# Patient Record
Sex: MALE | Race: WHITE | NOT HISPANIC OR LATINO | URBAN - METROPOLITAN AREA
[De-identification: names, ages, dates, MRNs, and addresses within clinical notes are randomized per-mention and may not be internally consistent; named-entity substitution may affect disease eponyms.]

---

## 2021-06-30 ENCOUNTER — HOSPITAL ENCOUNTER (OUTPATIENT)
Dept: RADIOLOGY | Facility: HOSPITAL | Age: 26
Discharge: HOME | End: 2021-06-30
Attending: RADIOLOGY

## 2021-06-30 DIAGNOSIS — Z76.89 REFERRAL OF PATIENT WITHOUT EXAMINATION OR TREATMENT: ICD-10-CM

## 2021-07-12 VITALS
DIASTOLIC BLOOD PRESSURE: 80 MMHG | WEIGHT: 315 LBS | HEIGHT: 68 IN | TEMPERATURE: 97.6 F | HEART RATE: 80 BPM | BODY MASS INDEX: 47.74 KG/M2 | SYSTOLIC BLOOD PRESSURE: 130 MMHG

## 2021-07-12 DIAGNOSIS — M76.72 PERONEAL TENDINITIS OF LOWER LEG, LEFT: ICD-10-CM

## 2021-07-12 DIAGNOSIS — S93.402A SPRAIN OF UNSPECIFIED LIGAMENT OF LEFT ANKLE, INITIAL ENCOUNTER: Primary | ICD-10-CM

## 2021-07-12 DIAGNOSIS — M25.572 ACUTE LEFT ANKLE PAIN: ICD-10-CM

## 2021-07-12 PROCEDURE — 99243 OFF/OP CNSLTJ NEW/EST LOW 30: CPT | Performed by: STUDENT IN AN ORGANIZED HEALTH CARE EDUCATION/TRAINING PROGRAM

## 2021-07-12 RX ORDER — LISINOPRIL 30 MG/1
30 TABLET ORAL
COMMUNITY
Start: 2021-03-26

## 2021-07-12 RX ORDER — CARVEDILOL 25 MG/1
25 TABLET ORAL
COMMUNITY
Start: 2021-03-26

## 2021-07-12 RX ORDER — AMLODIPINE BESYLATE 10 MG/1
TABLET ORAL
COMMUNITY
Start: 2021-06-26

## 2021-07-12 RX ORDER — HYDROCHLOROTHIAZIDE 12.5 MG/1
12.5 CAPSULE, GELATIN COATED ORAL
COMMUNITY
Start: 2021-03-26

## 2021-07-12 RX ORDER — OMEPRAZOLE 40 MG/1
CAPSULE, DELAYED RELEASE ORAL
COMMUNITY
Start: 2021-06-26

## 2021-07-12 RX ORDER — MONTELUKAST SODIUM 10 MG/1
TABLET ORAL
COMMUNITY
Start: 2021-06-21

## 2021-07-12 NOTE — LETTER
July 15, 2021     Bipinwilder LauraDO  [address] [facility]    Patient: Ren Pena   YOB: 1995   Date of Visit: 2021       Dear Dr Jennifer Ivory: Thank you for referring Collette Jean to me for evaluation  Below are my notes for this consultation  If you have questions, please do not hesitate to call me  I look forward to following your patient along with you  Sincerely,        Mague Corcoran MD        CC: No Recipients  Mague Corcoran MD  7/15/2021  7:54 AM  Signed  1  Sprain of unspecified ligament of left ankle, initial encounter  Ankle Cude ankle/Ankle Brace    Diclofenac Sodium (VOLTAREN) 1 %   2  Acute left ankle pain  Ankle Cude ankle/Ankle Brace    Diclofenac Sodium (VOLTAREN) 1 %   3  Peroneal tendinitis of lower leg, left       Orders Placed This Encounter   Procedures    Ankle Cude ankle/Ankle Brace        Imaging Studies (I personally reviewed images in PACS and report):    Prior imagin  X-ray left ankle 2021:  No acute osseous abnormalities  2  X-ray left foot 2021: Small chronic appearing osseous erosion, medial 1st MT head  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 location  However no definite tophi the surrounding soft tissues  Otherwise no acute osseous abnormalities  IMPRESSION:  1  Acute on chronic left lateral ankle pain  Tenderness over ATFL - patient reports pain improving progressively but concerned about re-aggravation  DDx; peroneal tendinitis    Reports chronic mild lateral ankle pain from a football injury 10 years prior where his ankle was inverted and treated as a sprain  Denies symptomatology of gout from clinical history/exam     - There is noted 1st head erosion on imaging, but patient non-tender over this area     Other factors: BMI 50    Consultation evaluation    PLAN:  - Clinical exam and radiographic imaging reviewed with patient today, with impression as per above    - Will treat as an ankle sprain at this time; patient prescribed lace-up ankle brace to be used during activities/work  - I offered referral physical therapy, patient prefers to do home exercises at this time which were provided  -  In regards to pain control, given patient's history of uncontrolled high blood pressure I have deferred prescribing oral NSAIDs  Instead, I have prescribed topical Voltaren gel which he can apply q i d  p r n  pain  I counseled he can also concurrently take acetaminophen with this medication as well as apply icing 20 minutes on/ off   -  Patient prefers to follow-up as needed if symptoms do not improve and/or worsen  CHIEF COMPLAINT:  Left ankle pain    HPI:  Alyssia Sanchez is a 31 y o  male  who presents in regards to referral from Dr Johnson Concepcion for       Visit 07/12/2021 :  Initial evaluation of left ankle pain: Of note, patient reports a history of chronic mild/tolerable pain over lateral ankle at baseline  He attributes it to an ankle injury in football 10 years ago where he had an inversion injury and was treated as an ankle sprain  He states that he has chronically had mild/tolerable discomfort over lateral ankle that would improve on its own  He states that approximately 2 weeks ago, he woke up with worsening pain over his lateral ankle with mild swelling  He denies a precipitating injury; but notes that he walks about 6-7 miles a day for work  Pain is located over lateral ankle and can radiates to his lateral distal half of his calf  He describes it as an aching/dull pain that was aggravated with prolonged ambulation  Denies bruising, numbness/tingling, balance issues, skin redness, fevers/chills, recent illness, and other ROS as per below  In regards to treatments, he has been elevating, using ACE wrap, and taking tylenol with some relief  He avoids NSAIDs as his PCP counseled it would elevate his blood pressure   Denies history of gout, and only recalls drinking "a couple shots" a day pr two before the pain started  He currently, states the pain has progressively improved but is concerned for re-aggravation of the issue in the future  He has never seen formal PT for his ankle  Review of Systems   Constitutional: Negative for chills, diaphoresis and fever  Respiratory: Negative for cough and shortness of breath  Gastrointestinal: Negative for abdominal pain, nausea and vomiting  Musculoskeletal:        As per HPI   Skin: Negative for rash  Neurological:        As per HPI         Medical, Surgical, Family, and Social History    Past Medical History:   Diagnosis Date    GERD (gastroesophageal reflux disease)     Hypertension     Seasonal allergies      History reviewed  No pertinent surgical history  Social History   Social History     Substance and Sexual Activity   Alcohol Use Yes    Comment: occassional     Social History     Substance and Sexual Activity   Drug Use Never     Social History     Tobacco Use   Smoking Status Never Smoker   Smokeless Tobacco Never Used     Family History   Problem Relation Age of Onset    Diabetes Mother     Cancer Mother     No Known Problems Father      Allergies   Allergen Reactions    Amoxicillin Hives     Reported as peds          Physical Exam  /80   Pulse 80   Temp 97 6 °F (36 4 °C)   Ht 5' 8" (1 727 m)   Wt (!) 151 kg (332 lb)   BMI 50 48 kg/m²     Constitutional:  see vital signs  Gen: obese (BMI 50), normocephalic/atraumatic, well-groomed  Eyes: No inflammation or discharge of conjunctiva or lids; sclera clear   Pharynx: no inflammation, lesion, or mass of lips  Neck: supple, no masses, non-distended  MSK: no inflammation, lesion, mass, or clubbing of nails and digits except for other than mentioned below  SKIN: no visible rashes or skin lesions  Pulmonary/Chest: Effort normal  No respiratory distress     NEURO: cranial nerves grossly intact  PSYCH:  Alert and oriented to person, place, and time; recent and remote memory intact; mood normal, no depression, anxiety, or agitation, judgment and insight good and intact     Ortho Exam   Ankle Examination (focused):     Gait: no limp      LEFT   Inspection Erythema none    Edema Mild lateral malleolus    Ecchymosis none        ROM:  Plantarflexion 50    Dorsiflexion 20        Strength Pronation 5/5    Supination 5/5        TTP AiTFL no    ATFL +    CFL no    PTFL no    Achilles no    Deltoid no    Peroneal +    Tib Ant no    Tib Post no        TTP (Bony) Prox Fibula no    Lat Malleolus no    Base of 5th MT no    Med Malleolus no    Navicular no    Talar Dome no        Anterior Drawer ATFL negative   Calcaneal Squeeze  negative   Tib-Fib Squeeze Test  negative   Talar Tilt (stab tib,DF foot,invert foot) CFL negative   ER Stress (stab tib,ER foot) High ankle negative   Eversion stress (stab tib, osmel foot) deltoid negative   MT Compression  negative     No calf tenderness to palpation     LE NV Exam: +2 DP/PT pulses   Sensation intact to light touch             Procedures

## 2021-07-12 NOTE — PROGRESS NOTES
1  Sprain of unspecified ligament of left ankle, initial encounter  Ankle Cude ankle/Ankle Brace    Diclofenac Sodium (VOLTAREN) 1 %   2  Acute left ankle pain  Ankle Cude ankle/Ankle Brace    Diclofenac Sodium (VOLTAREN) 1 %   3  Peroneal tendinitis of lower leg, left       Orders Placed This Encounter   Procedures    Ankle Cude ankle/Ankle Brace        Imaging Studies (I personally reviewed images in PACS and report):    Prior imagin  X-ray left ankle 2021:  No acute osseous abnormalities  2  X-ray left foot 2021: Small chronic appearing osseous erosion, medial 1st MT head  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 location  However no definite tophi the surrounding soft tissues  Otherwise no acute osseous abnormalities  IMPRESSION:  1  Acute on chronic left lateral ankle pain  Tenderness over ATFL - patient reports pain improving progressively but concerned about re-aggravation  DDx; peroneal tendinitis    Reports chronic mild lateral ankle pain from a football injury 10 years prior where his ankle was inverted and treated as a sprain  Denies symptomatology of gout from clinical history/exam     - There is noted 1st head erosion on imaging, but patient non-tender over this area     Other factors: BMI 50    Consultation evaluation    PLAN:  - Clinical exam and radiographic imaging reviewed with patient today, with impression as per above  - Will treat as an ankle sprain at this time; patient prescribed lace-up ankle brace to be used during activities/work  - I offered referral physical therapy, patient prefers to do home exercises at this time which were provided  -  In regards to pain control, given patient's history of uncontrolled high blood pressure I have deferred prescribing oral NSAIDs  Instead, I have prescribed topical Voltaren gel which he can apply q i d  p r n  pain    I counseled he can also concurrently take acetaminophen with this medication as well as apply icing 20 minutes on/ off   -  Patient prefers to follow-up as needed if symptoms do not improve and/or worsen  CHIEF COMPLAINT:  Left ankle pain    HPI:  Wing Summers is a 31 y o  male  who presents in regards to referral from Dr Shayna Hooper for       Visit 07/12/2021 :  Initial evaluation of left ankle pain: Of note, patient reports a history of chronic mild/tolerable pain over lateral ankle at baseline  He attributes it to an ankle injury in football 10 years ago where he had an inversion injury and was treated as an ankle sprain  He states that he has chronically had mild/tolerable discomfort over lateral ankle that would improve on its own  He states that approximately 2 weeks ago, he woke up with worsening pain over his lateral ankle with mild swelling  He denies a precipitating injury; but notes that he walks about 6-7 miles a day for work  Pain is located over lateral ankle and can radiates to his lateral distal half of his calf  He describes it as an aching/dull pain that was aggravated with prolonged ambulation  Denies bruising, numbness/tingling, balance issues, skin redness, fevers/chills, recent illness, and other ROS as per below  In regards to treatments, he has been elevating, using ACE wrap, and taking tylenol with some relief  He avoids NSAIDs as his PCP counseled it would elevate his blood pressure  Denies history of gout, and only recalls drinking "a couple shots" a day pr two before the pain started  He currently, states the pain has progressively improved but is concerned for re-aggravation of the issue in the future  He has never seen formal PT for his ankle  Review of Systems   Constitutional: Negative for chills, diaphoresis and fever  Respiratory: Negative for cough and shortness of breath  Gastrointestinal: Negative for abdominal pain, nausea and vomiting  Musculoskeletal:        As per HPI   Skin: Negative for rash     Neurological:        As per HPI         Medical, Surgical, Family, and Social History    Past Medical History:   Diagnosis Date    GERD (gastroesophageal reflux disease)     Hypertension     Seasonal allergies      History reviewed  No pertinent surgical history  Social History   Social History     Substance and Sexual Activity   Alcohol Use Yes    Comment: occassional     Social History     Substance and Sexual Activity   Drug Use Never     Social History     Tobacco Use   Smoking Status Never Smoker   Smokeless Tobacco Never Used     Family History   Problem Relation Age of Onset    Diabetes Mother     Cancer Mother     No Known Problems Father      Allergies   Allergen Reactions    Amoxicillin Hives     Reported as peds          Physical Exam  /80   Pulse 80   Temp 97 6 °F (36 4 °C)   Ht 5' 8" (1 727 m)   Wt (!) 151 kg (332 lb)   BMI 50 48 kg/m²     Constitutional:  see vital signs  Gen: obese (BMI 50), normocephalic/atraumatic, well-groomed  Eyes: No inflammation or discharge of conjunctiva or lids; sclera clear   Pharynx: no inflammation, lesion, or mass of lips  Neck: supple, no masses, non-distended  MSK: no inflammation, lesion, mass, or clubbing of nails and digits except for other than mentioned below  SKIN: no visible rashes or skin lesions  Pulmonary/Chest: Effort normal  No respiratory distress     NEURO: cranial nerves grossly intact  PSYCH:  Alert and oriented to person, place, and time; recent and remote memory intact; mood normal, no depression, anxiety, or agitation, judgment and insight good and intact     Ortho Exam   Ankle Examination (focused):     Gait: no limp      LEFT   Inspection Erythema none    Edema Mild lateral malleolus    Ecchymosis none        ROM:  Plantarflexion 50    Dorsiflexion 20        Strength Pronation 5/5    Supination 5/5        TTP AiTFL no    ATFL +    CFL no    PTFL no    Achilles no    Deltoid no    Peroneal +    Tib Ant no    Tib Post no        TTP (Bony) Prox Fibula no    Lat Malleolus no    Base of 5th MT no    Med Malleolus no    Navicular no    Talar Dome no        Anterior Drawer ATFL negative   Calcaneal Squeeze  negative   Tib-Fib Squeeze Test  negative   Talar Tilt (stab tib,DF foot,invert foot) CFL negative   ER Stress (stab tib,ER foot) High ankle negative   Eversion stress (stab tib, osmel foot) deltoid negative   MT Compression  negative     No calf tenderness to palpation     LE NV Exam: +2 DP/PT pulses   Sensation intact to light touch             Procedures